# Patient Record
Sex: MALE | Race: BLACK OR AFRICAN AMERICAN | URBAN - METROPOLITAN AREA
[De-identification: names, ages, dates, MRNs, and addresses within clinical notes are randomized per-mention and may not be internally consistent; named-entity substitution may affect disease eponyms.]

---

## 2020-07-11 ENCOUNTER — TELEPHONE ENCOUNTER (OUTPATIENT)
Dept: URBAN - METROPOLITAN AREA CLINIC 92 | Facility: CLINIC | Age: 75
End: 2020-07-11

## 2020-07-11 RX ORDER — TRAZODONE HYDROCHLORIDE 100 MG/1
TAKE 1 TABLET (100 MG) BY ORAL ROUTE ONCE DAILY AT BEDTIME FOR 30 DAYS TABLET, FILM COATED ORAL 1
Qty: 30 | Refills: 6 | Status: ACTIVE | COMMUNITY
Start: 2017-04-18 | End: 1900-01-01

## 2020-07-11 RX ORDER — LIDOCAINE 50 MG/G
APPLY 2-3 GRAMS TOPICALLY TO AFFECTED AREA 3-4 TIMES PER DAY OINTMENT TOPICAL
Qty: 1063.2 | Refills: 2 | Status: ACTIVE | COMMUNITY
Start: 2018-11-07 | End: 1900-01-01

## 2020-07-11 RX ORDER — ALLOPURINOL 300 MG/1
TAKE 1 TABLET BY ORAL ROUTE DAILY FOR 90 DAYS TABLET ORAL
Qty: 90 | Refills: 1 | Status: ACTIVE | COMMUNITY
Start: 2019-10-18 | End: 1900-01-01

## 2020-07-11 RX ORDER — BRIMONIDINE TARTRATE 2 MG/ML
APPLY 1 DROP TWICE DAILY TO BOTH EYES FOR 90 DAYS SOLUTION/ DROPS OPHTHALMIC
Qty: 15 | Refills: 0 | Status: ACTIVE | COMMUNITY
Start: 2019-08-29 | End: 1900-01-01

## 2020-07-11 RX ORDER — LOSARTAN POTASSIUM 100 MG/1
TAKE 1 TABLET BY MOUTH EVERY DAY TABLET, FILM COATED ORAL
Qty: 90 | Refills: 2 | Status: ACTIVE | COMMUNITY
Start: 2019-08-26 | End: 1900-01-01

## 2020-07-11 RX ORDER — AMLODIPINE BESYLATE 10 MG/1
TAKE 1 TABLET BY MOUTH EVERY DAY TABLET ORAL
Qty: 90 | Refills: 0 | Status: ACTIVE | COMMUNITY
Start: 2019-09-27 | End: 1900-01-01

## 2020-07-11 RX ORDER — LISINOPRIL 20 MG/1
TAKE 1 TABLET BY MOUTH EVERY DAY TABLET ORAL
Qty: 90 | Refills: 0 | Status: ACTIVE | COMMUNITY
Start: 2019-09-03 | End: 1900-01-01

## 2020-07-17 ENCOUNTER — OFFICE VISIT (OUTPATIENT)
Dept: URBAN - METROPOLITAN AREA CLINIC 92 | Facility: CLINIC | Age: 75
End: 2020-07-17
Payer: COMMERCIAL

## 2020-07-17 ENCOUNTER — LAB OUTSIDE AN ENCOUNTER (OUTPATIENT)
Dept: URBAN - METROPOLITAN AREA CLINIC 92 | Facility: CLINIC | Age: 75
End: 2020-07-17

## 2020-07-17 ENCOUNTER — DASHBOARD ENCOUNTERS (OUTPATIENT)
Age: 75
End: 2020-07-17

## 2020-07-17 ENCOUNTER — OFFICE VISIT (OUTPATIENT)
Dept: URBAN - METROPOLITAN AREA CLINIC 92 | Facility: CLINIC | Age: 75
End: 2020-07-17

## 2020-07-17 VITALS
HEIGHT: 69 IN | SYSTOLIC BLOOD PRESSURE: 106 MMHG | WEIGHT: 160 LBS | DIASTOLIC BLOOD PRESSURE: 71 MMHG | HEART RATE: 80 BPM | TEMPERATURE: 95.3 F | BODY MASS INDEX: 23.7 KG/M2

## 2020-07-17 DIAGNOSIS — E78.2 MIXED HYPERLIPIDEMIA: ICD-10-CM

## 2020-07-17 DIAGNOSIS — Z08 ENCOUNTER FOR FOLLOW-UP EXAMINATION AFTER COMPLETED TREATMENT FOR MALIGNANT NEOPLASM: ICD-10-CM

## 2020-07-17 DIAGNOSIS — I11.9 HYPERTENSIVE HEART DISEASE WITHOUT HEART FAILURE: ICD-10-CM

## 2020-07-17 DIAGNOSIS — Z85.038 PERSONAL HISTORY OF OTHER MALIGNANT NEOPLASM OF LARGE INTESTINE: ICD-10-CM

## 2020-07-17 PROBLEM — 10995641000119109: Status: ACTIVE | Noted: 2020-07-17

## 2020-07-17 PROBLEM — 267434003: Status: ACTIVE | Noted: 2020-07-17

## 2020-07-17 PROBLEM — 64715009: Status: ACTIVE | Noted: 2020-07-17

## 2020-07-17 PROCEDURE — 99213 OFFICE O/P EST LOW 20 MIN: CPT | Performed by: INTERNAL MEDICINE

## 2020-07-17 RX ORDER — BRIMONIDINE TARTRATE 2 MG/ML
APPLY 1 DROP TWICE DAILY TO BOTH EYES FOR 90 DAYS SOLUTION/ DROPS OPHTHALMIC
Qty: 15 | Refills: 0 | Status: ACTIVE | COMMUNITY
Start: 2019-08-29 | End: 1900-01-01

## 2020-07-17 RX ORDER — LIDOCAINE 50 MG/G
APPLY 2 GRAMS TOPICALLY TO THE AFFECTED AREA 2 TIMES PER DAY OINTMENT TOPICAL
Qty: 360 | Refills: 3 | Status: ACTIVE | COMMUNITY
Start: 2020-02-03 | End: 1900-01-01

## 2020-07-17 RX ORDER — TRAZODONE HYDROCHLORIDE 100 MG/1
TAKE 1 TABLET (100 MG) BY ORAL ROUTE ONCE DAILY AT BEDTIME FOR 30 DAYS TABLET, FILM COATED ORAL 1
Qty: 30 | Refills: 6 | Status: ACTIVE | COMMUNITY
Start: 2017-04-18 | End: 1900-01-01

## 2020-07-17 RX ORDER — LOSARTAN POTASSIUM 100 MG/1
TAKE 1 TABLET BY MOUTH EVERY DAY TABLET, FILM COATED ORAL
Qty: 30 | Refills: 5 | Status: ACTIVE | COMMUNITY
Start: 2019-11-21 | End: 1900-01-01

## 2020-07-17 RX ORDER — ALLOPURINOL 300 MG/1
TAKE 1 TABLET BY MOUTH DAILY TABLET ORAL
Qty: 90 | Refills: 2 | Status: ACTIVE | COMMUNITY
Start: 2020-04-23 | End: 1900-01-01

## 2020-07-17 RX ORDER — ALLOPURINOL 300 MG/1
TAKE 1 TABLET BY ORAL ROUTE DAILY FOR 90 DAYS TABLET ORAL
Qty: 90 | Refills: 1 | Status: ACTIVE | COMMUNITY
Start: 2019-10-18 | End: 1900-01-01

## 2020-07-17 RX ORDER — LOSARTAN POTASSIUM 100 MG/1
TAKE 1 TABLET BY MOUTH EVERY DAY TABLET, FILM COATED ORAL
Qty: 90 | Refills: 2 | Status: ACTIVE | COMMUNITY
Start: 2019-08-26 | End: 1900-01-01

## 2020-07-17 RX ORDER — LISINOPRIL 20 MG/1
TAKE 1 TABLET BY MOUTH EVERY DAY TABLET ORAL
Qty: 90 | Refills: 2 | Status: ACTIVE | COMMUNITY
Start: 2019-12-02 | End: 1900-01-01

## 2020-07-17 RX ORDER — BRIMONIDINE TARTRATE 2 MG/ML
APPLY 1 DROP TWICE DAILY TO BOTH EYES FOR 90 DAYS SOLUTION/ DROPS OPHTHALMIC
Qty: 15 | Refills: 0 | Status: ACTIVE | COMMUNITY
Start: 2020-05-15 | End: 1900-01-01

## 2020-07-17 RX ORDER — LIDOCAINE 50 MG/G
APPLY 2-3 GRAMS TOPICALLY TO AFFECTED AREA 3-4 TIMES PER DAY OINTMENT TOPICAL
Qty: 1063.2 | Refills: 2 | Status: ACTIVE | COMMUNITY
Start: 2018-11-07 | End: 1900-01-01

## 2020-07-17 RX ORDER — LISINOPRIL 20 MG/1
TAKE 1 TABLET BY MOUTH EVERY DAY TABLET ORAL
Qty: 90 | Refills: 0 | Status: ACTIVE | COMMUNITY
Start: 2019-09-03 | End: 1900-01-01

## 2020-07-17 RX ORDER — AMLODIPINE BESYLATE 10 MG/1
TAKE 1 TABLET BY MOUTH EVERY DAY TABLET ORAL
Qty: 90 | Refills: 0 | Status: ACTIVE | COMMUNITY
Start: 2019-09-27 | End: 1900-01-01

## 2020-07-17 RX ORDER — AMLODIPINE BESYLATE 10 MG/1
TAKE 1 TABLET BY MOUTH EVERY DAY TABLET ORAL
Qty: 90 | Refills: 3 | Status: ACTIVE | COMMUNITY
Start: 2020-01-02 | End: 1900-01-01

## 2020-07-17 NOTE — HPI-TODAY'S VISIT:
75-year-old male with well-controlled hypertension and hyperlipidemia and renal failure which are within normal range on medication is stable. He is due for surveillance colonoscopy because of a history of previous adenomatous polyps

## 2020-07-17 NOTE — HPI-TODAY'S VISIT:
75-year-old male comes for follow-up for evaluation of hypertensive cardiovascular disease which is well controlled on amlodipine and losartan and metoprolol.  She has chronic renal failure stage III associated with previous use of nonsteroidal stable will repeat lab.  Hyperlipidemia on statins well-controlled will repeat labs. Has documented history of adenomatous colon polyps and he is due for surveillance colonoscopy will arrange a colonoscopy. He has been getting multiple requests for diabetic testing supplies even though he is not diabetic and he has told the provider not to send anything he is also getting large amounts of lidocaine ointment and omega-3 fatty acid that he does not use and he does not need will notify the insurance provider to stop the services.
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
## 2020-07-18 LAB
A/G RATIO: 1.5
ALBUMIN: 4.4
ALKALINE PHOSPHATASE: 66
ALT (SGPT): 30
AST (SGOT): 43
BASO (ABSOLUTE): 0.1
BASOS: 1
BILIRUBIN, TOTAL: 0.4
BUN/CREATININE RATIO: 15
BUN: 20
CALCIUM: 10.8
CARBON DIOXIDE, TOTAL: 25
CHLORIDE: 104
CHOLESTEROL, TOTAL: 170
COMMENT:: (no result)
CREATININE: 1.3
EGFR IF AFRICN AM: 62
EGFR IF NONAFRICN AM: 54
EOS (ABSOLUTE): 0.5
EOS: 7
GLOBULIN, TOTAL: 2.9
GLUCOSE: 97
HDL CHOLESTEROL: 40
HEMATOCRIT: 40.4
HEMATOLOGY COMMENTS:: (no result)
HEMOGLOBIN: 14.1
IMMATURE CELLS: (no result)
IMMATURE GRANS (ABS): 0
IMMATURE GRANULOCYTES: 0
LDL CHOLESTEROL CALC: 65
LYMPHS (ABSOLUTE): 2.5
LYMPHS: 33
MCH: 34.1
MCHC: 34.9
MCV: 98
MONOCYTES(ABSOLUTE): 0.8
MONOCYTES: 10
NEUTROPHILS (ABSOLUTE): 3.8
NEUTROPHILS: 49
NRBC: (no result)
PLATELETS: 262
POTASSIUM: 4.6
PROTEIN, TOTAL: 7.3
RBC: 4.14
RDW: 14.5
SODIUM: 142
TRIGLYCERIDES: 324
VLDL CHOLESTEROL CAL: 65
WBC: 7.6

## 2020-07-22 ENCOUNTER — OFFICE VISIT (OUTPATIENT)
Dept: URBAN - METROPOLITAN AREA SURGERY CENTER 16 | Facility: SURGERY CENTER | Age: 75
End: 2020-07-22
Payer: COMMERCIAL

## 2020-07-22 DIAGNOSIS — Z86.010 H/O ADENOMATOUS POLYP OF COLON: ICD-10-CM

## 2020-07-22 PROCEDURE — G8907 PT DOC NO EVENTS ON DISCHARG: HCPCS | Performed by: INTERNAL MEDICINE

## 2020-07-22 PROCEDURE — G0105 COLORECTAL SCRN; HI RISK IND: HCPCS | Performed by: INTERNAL MEDICINE

## 2020-07-22 PROCEDURE — G9936 PMH PLYP/NEO CO/RECT/JUN/ANS: HCPCS | Performed by: INTERNAL MEDICINE

## 2020-11-20 ENCOUNTER — OFFICE VISIT (OUTPATIENT)
Dept: URBAN - METROPOLITAN AREA CLINIC 92 | Facility: CLINIC | Age: 75
End: 2020-11-20